# Patient Record
Sex: MALE | Race: BLACK OR AFRICAN AMERICAN | Employment: PART TIME | URBAN - METROPOLITAN AREA
[De-identification: names, ages, dates, MRNs, and addresses within clinical notes are randomized per-mention and may not be internally consistent; named-entity substitution may affect disease eponyms.]

---

## 2020-03-22 ENCOUNTER — HOSPITAL ENCOUNTER (EMERGENCY)
Facility: CLINIC | Age: 35
Discharge: HOME | End: 2020-03-22
Attending: EMERGENCY MEDICINE | Admitting: EMERGENCY MEDICINE
Payer: COMMERCIAL

## 2020-03-22 ENCOUNTER — APPOINTMENT (OUTPATIENT)
Dept: GENERAL RADIOLOGY | Facility: CLINIC | Age: 35
End: 2020-03-22
Attending: EMERGENCY MEDICINE
Payer: COMMERCIAL

## 2020-03-22 VITALS
WEIGHT: 160 LBS | RESPIRATION RATE: 18 BRPM | OXYGEN SATURATION: 99 % | BODY MASS INDEX: 24.25 KG/M2 | DIASTOLIC BLOOD PRESSURE: 81 MMHG | HEIGHT: 68 IN | TEMPERATURE: 98.7 F | SYSTOLIC BLOOD PRESSURE: 114 MMHG | HEART RATE: 82 BPM

## 2020-03-22 DIAGNOSIS — S63.501A WRIST SPRAIN, RIGHT, INITIAL ENCOUNTER: ICD-10-CM

## 2020-03-22 DIAGNOSIS — S69.91XA HAND INJURY, RIGHT, INITIAL ENCOUNTER: ICD-10-CM

## 2020-03-22 PROCEDURE — 99285 EMERGENCY DEPT VISIT HI MDM: CPT

## 2020-03-22 PROCEDURE — 29125 APPL SHORT ARM SPLINT STATIC: CPT | Mod: RT

## 2020-03-22 PROCEDURE — 25000132 ZZH RX MED GY IP 250 OP 250 PS 637: Performed by: EMERGENCY MEDICINE

## 2020-03-22 PROCEDURE — 73130 X-RAY EXAM OF HAND: CPT | Mod: RT

## 2020-03-22 RX ORDER — IBUPROFEN 600 MG/1
600 TABLET, FILM COATED ORAL ONCE
Status: COMPLETED | OUTPATIENT
Start: 2020-03-22 | End: 2020-03-22

## 2020-03-22 RX ADMIN — IBUPROFEN 600 MG: 600 TABLET, FILM COATED ORAL at 09:39

## 2020-03-22 ASSESSMENT — ENCOUNTER SYMPTOMS
ARTHRALGIAS: 1
NUMBNESS: 0

## 2020-03-22 ASSESSMENT — MIFFLIN-ST. JEOR: SCORE: 1650.26

## 2020-03-22 NOTE — ED AVS SNAPSHOT
[facility] Emergency Department  [address]  [facility] [address]  Phone:  [phone]  Fax:  [phone]                                    Naldo Cabral   MRN: [id number]    Department:  [facility] Emergency Department   Date of Visit:  3/22/2020           After Visit Summary Signature Page    I have received my discharge instructions, and my questions have been answered. I have discussed any challenges I see with this plan with the nurse or doctor.    ..........................................................................................................................................  Patient/Patient Representative Signature      ..........................................................................................................................................  Patient Representative Print Name and Relationship to Patient    ..................................................               ................................................  Date                                   Time    ..........................................................................................................................................  Reviewed by Signature/Title    ...................................................              ..............................................  Date                                               Time          22EPIC Rev 08/18

## 2020-03-22 NOTE — ED TRIAGE NOTES
pt dropped a tire on his right forearm yesterday mariam around 1900. C/o pain with touch and swelling. Took tylenol without relief.

## 2020-03-22 NOTE — ED PROVIDER NOTES
"  History     Chief Complaint:  Arm Injury    The history is provided by the patient.      Naldo Cabral is a right-hand dominant 34 year old male who presents with right wrist and hand pain after dropping a tire on his wrist yesterday. He reports taking Tylenol for his pain but has not found any relief with that. No numbness or weakness but pain increases with movement.    Patient denies any elbow pain or history of fracture or surgery to his right hand/wrist.    Allergies:  No Known Drug Allergies     Medications:    The patient is not currently taking any prescribed medications.    Past Medical History:    The patient denies any significant past medical history.    Past Surgical History:    The patient does not have any pertinent past surgical history.    Family History:    No past pertinent family history.    Social History:  Negative for tobacco use.  Positive for alcohol use.  Negative for drug use.  Marital Status:  Single      Review of Systems   Musculoskeletal: Positive for arthralgias (right wrist and hand).   Neurological: Negative for numbness.     Physical Exam     Patient Vitals for the past 24 hrs:   BP Temp Temp src Pulse Resp SpO2 Height Weight   03/22/20 0907 114/81 98.7  F (37.1  C) Temporal 82 18 99 % 1.727 m (5' 8\") 72.6 kg (160 lb)       Physical Exam  Resp:               Non-labored  Neuro:             Alert and cooperative  MSkel:             Moving all extremities  RUE:                No tenderness at elbow or in forearm.  Dorsum of hand tender.  No tenderness at anatomic snuff box, MCPs or fingers.  Cap refill normal.  Sensation normal.  Strength normal though effort increases pain.  Skin:                No rash or wound    Emergency Department Course   Imaging:  Radiology findings were communicated with the patient who voiced understanding of the findings.    XR Hand, Right, G/E 3 views:   Normal joint spacing and alignment. No fracture. As per radiology. "     Procedures  None.    Interventions:  0939 Ibuprofen 600 mg PO    Emergency Department Course:  Past medical records, nursing notes, and vitals reviewed.    0915 I performed an exam of the patient as documented above.     The patient was sent for a right hand x-ray while in the emergency department, results above.     1008 I rechecked the patient and discussed the results of his workup thus far.     Findings and plan explained to the Patient. Patient discharged home with instructions regarding supportive care, medications, and reasons to return. The importance of close follow-up was reviewed.     I personally reviewed the imaging results with the Patient and answered all related questions prior to discharge.     Impression & Plan   Medical Decision Making:  Naldo Cabral is a 34 year old male who presents with persistent right hand and wrist pain after an injury. X-rays were obtained with no evidence of fracture or dislocation. There is no tenderness over the anatomic snuff box and occult fracture of the scaphoid is not suspected. He will be treated with over the counter analgesics and a Velcro wrist splint letter was provided for work. Follow up in 1-2 weeks if symptoms persist.    Diagnosis:    ICD-10-CM    1. Wrist sprain, right, initial encounter  S63.501A    2. Hand injury, right, initial encounter  S69.91XA        Disposition:  Discharged to home.    Discharge Medications:  There are no discharge medications for this patient.    Scribe Disclosure:  I, Selena Burden, am serving as a scribe at 9:18 AM on 3/22/2020 to document services personally performed by Loan Carreno MD,* based on my observations and the provider's statements to me.      Loan Carreno MD  03/22/20 4553

## 2020-03-22 NOTE — LETTER
[facility] EMERGENCY DEPARTMENT  [address]  [facility] [address]  [phone]    Naldo Cabral  [address]  [facility] [address]  [phone] (home)     : 1985      To Whom it may concern:    Naldo Cabral was seen in our Emergency Department today, 2020 for an injury hand and wrist injury.  He can return to work with use of a wrist brace as needed.  Lifting restrictions may be required based on symptom control.  Follow-up in 1-2 weeks if symptoms persist.    Sincerely,      Loan Carreno MD

## 2020-04-21 ENCOUNTER — HOSPITAL ENCOUNTER (EMERGENCY)
Facility: CLINIC | Age: 35
Discharge: HOME | End: 2020-04-21
Attending: EMERGENCY MEDICINE | Admitting: EMERGENCY MEDICINE
Payer: COMMERCIAL

## 2020-04-21 VITALS
RESPIRATION RATE: 18 BRPM | SYSTOLIC BLOOD PRESSURE: 110 MMHG | OXYGEN SATURATION: 98 % | DIASTOLIC BLOOD PRESSURE: 68 MMHG | TEMPERATURE: 97.4 F

## 2020-04-21 DIAGNOSIS — L50.9 URTICARIAL RASH: ICD-10-CM

## 2020-04-21 PROCEDURE — 99282 EMERGENCY DEPT VISIT SF MDM: CPT

## 2020-04-21 RX ORDER — DIPHENHYDRAMINE HCL 25 MG
25 TABLET ORAL EVERY 6 HOURS PRN
Qty: 30 TABLET | Refills: 0 | Status: SHIPPED | OUTPATIENT
Start: 2020-04-21

## 2020-04-21 RX ORDER — DIAPER,BRIEF,INFANT-TODD,DISP
EACH MISCELLANEOUS
Qty: 1 TUBE | Refills: 0 | Status: SHIPPED | OUTPATIENT
Start: 2020-04-21

## 2020-04-21 ASSESSMENT — ENCOUNTER SYMPTOMS
NAUSEA: 0
TROUBLE SWALLOWING: 0
ABDOMINAL PAIN: 0
DIARRHEA: 0
COUGH: 0
VOMITING: 0
FEVER: 0
RESPIRATORY NEGATIVE: 1

## 2020-04-21 NOTE — ED AVS SNAPSHOT
[facility] Emergency Department  [address]  [facility] [address]  Phone:  [phone]  Fax:  [phone]                                    Naldo Cabral   MRN: [id number]    Department:  [facility] Emergency Department   Date of Visit:  4/21/2020           After Visit Summary Signature Page    I have received my discharge instructions, and my questions have been answered. I have discussed any challenges I see with this plan with the nurse or doctor.    ..........................................................................................................................................  Patient/Patient Representative Signature      ..........................................................................................................................................  Patient Representative Print Name and Relationship to Patient    ..................................................               ................................................  Date                                   Time    ..........................................................................................................................................  Reviewed by Signature/Title    ...................................................              ..............................................  Date                                               Time          22EPIC Rev 08/18

## 2020-04-21 NOTE — ED TRIAGE NOTES
Pt arrives with rash on back of neck, chest, and bilateral upper arms starting 2 days ago. Pt states it is itchy and mildly painful. ABCs intact.

## 2020-04-21 NOTE — ED PROVIDER NOTES
History     Chief Complaint:  Rash      HPI   Naldo Cabral is a 34 year old male who presents with rash. The patient says that 2 days ago he started using a new detergent as well as new energy pill. He says that since then he developed a rash on his du, chest, and bilateral upper arms. He endorses the use of an over the counter steroid cream to no relief. He denies any fever, cough, nausea, vomiting, diarrhea, abdominal pain, breathing issues, or swallowing issues.     Allergies:  No Known Drug Allergies    Medications:    Medications reviewed. No current medications.     Past Medical History:    Chlamydia  Trichomonas contact     Past Surgical History:    Surgical history reviewed. No pertinent surgical history.    Family History:    Lung cancer     Social History:  Smoking Status: Former Smoker  Smokeless Tobacco: Never Used  Alcohol Use: Negative   [facility]P: [facility]   Marital Status:  Single      Review of Systems   Constitutional: Negative for fever.   HENT: Negative for trouble swallowing.    Respiratory: Negative.  Negative for cough.    Gastrointestinal: Negative for abdominal pain, diarrhea, nausea and vomiting.   Skin: Positive for rash.   All other systems reviewed and are negative.      Physical Exam     Patient Vitals for the past 24 hrs:   BP Temp Temp src Heart Rate Resp SpO2   04/21/20 1536 110/68 97.4  F (36.3  C) Oral 81 18 98 %        Physical Exam  General: Alert, no acute distress; nontoxic appearing  HEENT:  Moist mucous membranes.  Posterior oropharynx clear, no exudates.  Conjunctiva normal.   CV:  RRR, no m/r/g, skin warm and well perfused  Pulm:  CTAB, no wheezes/ronchi/rales.  No acute distress, breathing comfortably  GI:  Soft, nontender, nondistended.  No rebound or guarding.  Normal bowel sounds  MSK:  Moving all extremities.  No focal areas of edema, erythema, or tenderness  Skin:  WWP, no lower extremity edema, skin color normal, no diaphoresis; sporadic  blanching urticarial rash to upper arms, abdomen, chest, posterior neck; no palmar rash      Emergency Department Course     Emergency Department Course:    1549 Nursing notes and vitals reviewed.    1600 I performed an exam of the patient as documented above.      The patient is discharged to home.     Impression & Plan      Medical Decision Making:  Naldo Cabral is a 34 year old male who presented to the ED here today for evaluation of rash. Differentials considered included allergic phenomena, angioedema, cellulitis, vasculitis, drug reaction, viral exanthem, SJS, TEN, atopic dermatitis, psoriasis, tinea infections, amongst others. Symptoms here today most consistent with allergic phenomena.  There are no signs of systemic reaction such as angioedema, respiratory compromise, shock, oropharynx edema, etc.  The patient overall looks well here today without signs of serious allergic reaction/anaphylaxis. They were discharged with the blow medications.  They will follow-up with her primary care provider within the next 2-3 days for recheck.  They will return to the ED for any changing worsening symptoms, wheezing, progressive shortness of breath, facial or throat/tongue swelling, new concerns.  All questions were answered prior to patient's discharge.  They were in agreement with the treatment plan as stated above.    Diagnosis:    ICD-10-CM    1. Urticarial rash  L50.9      Disposition:   Findings and plan explained to the Patient. Patient discharged home with instructions regarding supportive care, medications, and reasons to return. The importance of close follow-up was reviewed.      Discharge Medications:  Discharge Medication List as of 4/21/2020  4:11 PM      START taking these medications    Details   diphenhydrAMINE (BENADRYL) 25 MG tablet Take 1 tablet (25 mg) by mouth every 6 hours as needed for itching or allergies, Disp-30 tablet,R-0, Local Print      hydrocortisone (CORTAID) 1 % external ointment Apply  to affected areas of skin 4 times a day for 1 weekDisp-1 Tube,R-0Local Print             Scribe Disclosure:  I, Tal Gonzalez, am serving as a scribe at 3:59 PM on 4/21/2020 to document services personally performed by Harlan Marquez MD based on my observations and the provider's statements to me.      [facility] EMERGENCY DEPARTMENT       Harlan Marquez MD  04/21/20 7554

## 2020-08-17 ENCOUNTER — HOSPITAL ENCOUNTER (EMERGENCY)
Facility: CLINIC | Age: 35
Discharge: HOME | End: 2020-08-17
Attending: EMERGENCY MEDICINE | Admitting: EMERGENCY MEDICINE
Payer: COMMERCIAL

## 2020-08-17 VITALS
RESPIRATION RATE: 20 BRPM | SYSTOLIC BLOOD PRESSURE: 127 MMHG | TEMPERATURE: 99.2 F | BODY MASS INDEX: 23.39 KG/M2 | WEIGHT: 149.03 LBS | HEIGHT: 67 IN | OXYGEN SATURATION: 97 % | DIASTOLIC BLOOD PRESSURE: 76 MMHG | HEART RATE: 65 BPM

## 2020-08-17 DIAGNOSIS — J02.0 ACUTE STREPTOCOCCAL PHARYNGITIS: ICD-10-CM

## 2020-08-17 LAB
DEPRECATED S PYO AG THROAT QL EIA: POSITIVE
SPECIMEN SOURCE: ABNORMAL

## 2020-08-17 PROCEDURE — 87880 STREP A ASSAY W/OPTIC: CPT | Performed by: EMERGENCY MEDICINE

## 2020-08-17 PROCEDURE — 99283 EMERGENCY DEPT VISIT LOW MDM: CPT

## 2020-08-17 PROCEDURE — U0003 INFECTIOUS AGENT DETECTION BY NUCLEIC ACID (DNA OR RNA); SEVERE ACUTE RESPIRATORY SYNDROME CORONAVIRUS 2 (SARS-COV-2) (CORONAVIRUS DISEASE [COVID-19]), AMPLIFIED PROBE TECHNIQUE, MAKING USE OF HIGH THROUGHPUT TECHNOLOGIES AS DESCRIBED BY CMS-2020-01-R: HCPCS | Performed by: EMERGENCY MEDICINE

## 2020-08-17 RX ORDER — AMOXICILLIN 500 MG/1
500 CAPSULE ORAL 2 TIMES DAILY
Qty: 20 CAPSULE | Refills: 0 | Status: SHIPPED | OUTPATIENT
Start: 2020-08-17 | End: 2020-08-27

## 2020-08-17 ASSESSMENT — ENCOUNTER SYMPTOMS
VOMITING: 0
COUGH: 0
FACIAL SWELLING: 0
ACTIVITY CHANGE: 0
FEVER: 1
NECK PAIN: 0
SEIZURES: 0
FATIGUE: 0
ABDOMINAL PAIN: 0
MYALGIAS: 0
CONFUSION: 0
SORE THROAT: 1
SHORTNESS OF BREATH: 0
TROUBLE SWALLOWING: 0
NUMBNESS: 0
NECK STIFFNESS: 0
WOUND: 0
WEAKNESS: 0
DIARRHEA: 0
NAUSEA: 0

## 2020-08-17 ASSESSMENT — MIFFLIN-ST. JEOR: SCORE: 1579.63

## 2020-08-17 NOTE — ED TRIAGE NOTES
C/o sore throat and headache for 3 days. Also c/o subjective fevers at home. Denies exposure to anyone who was Covid positive.

## 2020-08-17 NOTE — ED PROVIDER NOTES
History     Chief Complaint:  Sore throat       HPI  Naldo Cabral is a 35 year old year old male who presents for evaluation of 3 days of sore throat and subjective fevers.  Patient notes pain with swallowing and mild hoarseness of his voice.  He denies any cough, shortness of breath, chest pain.  No nausea or vomiting.  No diarrhea.  He denies any abdominal pain.  No rash.  No loss of sense of taste or smell.  He denies any known ill contacts.  He notes mild ear fullness but no significant pain.  He reports he has had strep throat in the past and this feels similar.  He denies any other symptoms at this time.        Allergies:  No Known Drug Allergies      Medications:   Past medical history reviewed. No pertinent medical history.      Medical History:   Chlamydia      Surgical History   Surgical history reviewed. No pertinent surgical history.      Family History:   Lung cancer      Social History:  Smoking Status: Former Smoker    Type: Cigarettes    Quit 2012  Smokeless Tobacco: Never Used  Alcohol Use: Negative  Drug Use: Negative  Primary Care: [facility]       Review of Systems   Constitutional: Positive for fever. Negative for activity change and fatigue.   HENT: Positive for ear pain and sore throat. Negative for congestion, facial swelling, hearing loss, mouth sores and trouble swallowing.    Respiratory: Negative for cough and shortness of breath.    Cardiovascular: Negative for chest pain.   Gastrointestinal: Negative for abdominal pain, diarrhea, nausea and vomiting.   Musculoskeletal: Negative for myalgias, neck pain and neck stiffness.   Skin: Negative for rash and wound.   Neurological: Negative for seizures, syncope, weakness and numbness.   Psychiatric/Behavioral: Negative for confusion.   All other systems reviewed and are negative.        Physical Exam     Patient Vitals for the past 24 hrs:   BP Temp Temp src Pulse Resp SpO2 Height Weight   08/17/20 0808 127/76 99.2  F  "(37.3  C) Temporal 65 20 97 % 1.702 m (5' 7\") 67.6 kg (149 lb 0.5 oz)          Physical Exam  General: Well appearing, nontoxic. Resting comfortably  Head:  Scalp, face, and head appear normal  Eyes:  Pupils are equal, round    Conjunctivae non-injected and sclerae white  ENT:    The external nose is normal    Pinnae are normal.  Bilateral auditory canals are normal and clear.  Bilateral tympanic membranes are mildly erythematous without any significant middle ear effusions, bulging, bleeding or perforation.    The oropharynx is normal, mucous membranes moist    Posterior pharynx is erythematous with +1 tonsillar swelling and left tonsillar exudates.  There is no peritonsillar swelling or fullness.  No asymmetrical swelling.  Uvula is normal. Uvula is in the midline.  No trismus.  Patient is tolerating secretions normally.  Tongue is normal.  Neck:  Normal range of motion.  No significant cervical lymphadenopathy.  The soft tissues of the anterior neck and sublingual region are normal.    There is no rigidity noted    Trachea is in the midline  CV:  Regular rate and rhythm     Normal S1/S2, no S3/S4    No murmur or rub  Resp:  Lungs are clear and equal bilaterally    There is no tachypnea    No increased work of breathing    No rales, wheezing, or rhonchi  GI:  Abdomen is soft, no rigidity or guarding    No distension, or mass    No tenderness or rebound tenderness   MS:  Normal muscular tone  Skin:  No rash or acute skin lesions noted  Neuro: Awake and alert    Speech is normal and fluent    Moves all extremities spontaneously  Psych:  Normal affect.  Appropriate interactions.      Emergency Department Course     Laboratory:  Laboratory findings were communicated with the patient who voiced understanding of the findings.    COVID-19 Virus (Coronavirus), PCR NP Swab: pending    Rapid Strep Test: Positive      Emergency Department Course:    0810 Nursing notes and vitals reviewed.    0820 I performed an exam of the " patient as documented above.     0832 A nares swab sample was obtained for laboratory testing as documented above.    0905 Findings and plan explained to the Patient. Patient discharged home with instructions regarding supportive care, medications, and reasons to return. The importance of close follow-up was reviewed. The patient was prescribed as below.    Impression & Plan     Medical Decision Making:  Naldo Cabral is a 35 year old male who presents with sore throat and clinical evidence of pharyngitis.  The rapid strep test is positive. I see no clinical evidence of  peritonsillar abscess, retropharyngeal abscess, Lemierre's Syndrome, epiglottis, or Jones's angina. The patient's symptoms are consistent with streptococcal pharyngitis.  I have recommended treatment with antibiotics and analgesics.  Also considered is COVID-19. The patient was tested and this is pending. Given the positive rapid strep test this is less likely. Return if increasing pain, change in voice, neck pain, vomiting, fever, or shortness of breath. Follow-up with primary physician if not improving in 3-5 days. Return precautions were discussed with patient. The patient's questions were answered and the patient was agreeable with discharge.     Covid-19  Naldo Cabral was evaluated during a global COVID-19 pandemic, which necessitated consideration that the patient might be at risk for infection with the SARS-CoV-2 virus that causes COVID-19.   Applicable protocols for evaluation were followed during the patient's care.   COVID-19 was considered as part of the patient's evaluation. The plan for testing is:  a test was obtained during this visit.      Diagnosis:     ICD-10-CM    1. Acute streptococcal pharyngitis  J02.0         Disposition:  Discharged to home.    Discharge Medications:  New Prescriptions    ACETAMINOPHEN 500 MG CAPS    Take 2 capsules by mouth every 8 hours as needed For aches, pain, fever    AMOXICILLIN (AMOXIL) 500 MG CAPSULE     Take 1 capsule (500 mg) by mouth 2 times daily for 10 days       Scribe Disclosure:  I, Farhana Ardon, am serving as a scribe at 8:11 AM on 8/17/2020 to document services personally performed by London Shepard MD based on my observations and the provider's statements to me.      London Shepard MD  08/17/20 0910

## 2020-08-17 NOTE — ED AVS SNAPSHOT
[facility] Emergency Department  [address]  [facility] [address]  Phone:  [phone]  Fax:  [phone]                                    Naldo Cabral   MRN: [id number]    Department:  [facility] Emergency Department   Date of Visit:  8/17/2020           After Visit Summary Signature Page    I have received my discharge instructions, and my questions have been answered. I have discussed any challenges I see with this plan with the nurse or doctor.    ..........................................................................................................................................  Patient/Patient Representative Signature      ..........................................................................................................................................  Patient Representative Print Name and Relationship to Patient    ..................................................               ................................................  Date                                   Time    ..........................................................................................................................................  Reviewed by Signature/Title    ...................................................              ..............................................  Date                                               Time          22EPIC Rev 08/18

## 2020-08-17 NOTE — LETTER
August 17, 2020      To Whom It May Concern:      Naldo Cabral was seen in our Emergency Department today, 08/17/20.  I expect his condition to improve over the next 2-3 days.  He may return to work when improved.    Sincerely,        London Shepard MD

## 2020-08-18 LAB
SARS-COV-2 RNA SPEC QL NAA+PROBE: NOT DETECTED
SPECIMEN SOURCE: NORMAL

## 2020-08-19 ENCOUNTER — NURSE TRIAGE (OUTPATIENT)
Dept: NURSING | Facility: CLINIC | Age: 35
End: 2020-08-19

## 2020-08-19 NOTE — TELEPHONE ENCOUNTER
Coronavirus (COVID-19) Notification    Lab Result   Lab test 2019-nCoV rRt-PCR OR SARS-COV-2 PCR    Nasopharyngeal AND/OR Oropharyngeal swab is NEGATIVE for 2019-nCoV RNA [OR] SARS-COV-2 RNA (COVID-19) RNA    Your result was negative. This means that we didn't find the virus that causes COVID-19 in your sample. A test may show negative when you do actually have the virus. This can happen when the virus is in the early stages of infection, before you feel illness symptoms.    If you have symptoms   Stay home and away from others (self-isolate) until you meet ALL of the guidelines below:    You've had no fever--and no medicine that reduces fever--for 3 full days (72 hours). And      Your other symptoms have gotten better. For example, your cough or breathing has improved. And     At least 10 days have passed since your symptoms started.    During this time:    Stay home. Don't go to work, school or anywhere else.     Stay in your own room, including for meals. Use your own bathroom if you can.    Stay away from others in your home. No hugging, kissing or shaking hands. No visitors.    Clean  high touch  surfaces often (doorknobs, counters, handles, etc.). Use a household cleaning spray or wipes. You can find a full list on the EPA website at www.epa.gov/pesticide-registration/list-n-disinfectants-use-against-sars-cov-2.    Cover your mouth and nose with a mask, tissue or washcloth to avoid spreading germs.    Wash your hands and face often with soap and water.    Going back to work  Check with your employer for any guidelines to follow for going back to work.  You are sent a letter for your Employer which will serve as formal document notice that you, the employee, tested negative for COVID-19, as of the testing date shown above.    If your symptoms worsen or other concerning symptoms, contact PCP, oncare or consider returning to Emergency Dept.    Where can I get more information?    [facility]:  [email or web address]    Coronavirus Basics: www.health.[facility]./diseases/coronavirus/basics.html    [facility] Hotline (331-288-0020)      Jasmin Mckeon RN  [facility]

## 2020-08-21 ENCOUNTER — HOSPITAL ENCOUNTER (EMERGENCY)
Facility: CLINIC | Age: 35
Discharge: HOME | End: 2020-08-21
Attending: PHYSICIAN ASSISTANT | Admitting: PHYSICIAN ASSISTANT
Payer: COMMERCIAL

## 2020-08-21 VITALS
HEART RATE: 87 BPM | WEIGHT: 143.3 LBS | BODY MASS INDEX: 22.49 KG/M2 | OXYGEN SATURATION: 98 % | DIASTOLIC BLOOD PRESSURE: 87 MMHG | TEMPERATURE: 98.6 F | SYSTOLIC BLOOD PRESSURE: 139 MMHG | RESPIRATION RATE: 18 BRPM | HEIGHT: 67 IN

## 2020-08-21 DIAGNOSIS — T50.905A MEDICATION REACTION, INITIAL ENCOUNTER: ICD-10-CM

## 2020-08-21 DIAGNOSIS — J02.0 STREPTOCOCCAL PHARYNGITIS: ICD-10-CM

## 2020-08-21 PROCEDURE — 25000132 ZZH RX MED GY IP 250 OP 250 PS 637: Performed by: PHYSICIAN ASSISTANT

## 2020-08-21 PROCEDURE — 99283 EMERGENCY DEPT VISIT LOW MDM: CPT

## 2020-08-21 RX ORDER — IBUPROFEN 600 MG/1
600 TABLET, FILM COATED ORAL ONCE
Status: COMPLETED | OUTPATIENT
Start: 2020-08-21 | End: 2020-08-21

## 2020-08-21 RX ORDER — AZITHROMYCIN 500 MG/1
500 TABLET, FILM COATED ORAL DAILY
Qty: 5 TABLET | Refills: 0 | Status: SHIPPED | OUTPATIENT
Start: 2020-08-21 | End: 2020-08-26

## 2020-08-21 RX ADMIN — IBUPROFEN 600 MG: 600 TABLET ORAL at 10:04

## 2020-08-21 ASSESSMENT — ENCOUNTER SYMPTOMS
COUGH: 0
CHILLS: 1
SORE THROAT: 1
TROUBLE SWALLOWING: 1
FEVER: 1

## 2020-08-21 ASSESSMENT — MIFFLIN-ST. JEOR: SCORE: 1553.63

## 2020-08-21 NOTE — LETTER
August 21, 2020      To Whom It May Concern:      Naldo Cabral was seen in our Emergency Department today, 08/21/20.  I expect his condition to improve over the next 2 days.  He may return to work/school when improved.    Sincerely,        Lady Eastman RN

## 2020-08-21 NOTE — DISCHARGE INSTRUCTIONS
*No school or work until you have been without a fever for 24 hours with tylenol or motrin.  *Take medications as prescribed.  Ibuprofen and/or tylenol for pain. Continue your current medications  *Follow-up with your doctor for a recheck in 2-3 days.    *Return if you develop difficulty breathing or swallowing, are unable to keep fluids down, faint or feel like you will faint or become worse in any way.       Discharge Instructions  Sore Throat  You were seen today for a sore throat.  Most (>80%) sore throats are caused by a virus. Antibiotics do not help with viral infections, but you can fight off the virus on your own.  In this case, your sore throat would be treated with medications for your pain and fever.    Strep throat is a kind of sore throat caused by Group A streptococcus bacteria.  This type of sore throat is treated with antibiotics.  If you had a rapid test done today for strep throat and it did not show infection, a culture is done in some cases. The culture can take several days to complete. If the culture shows you have strep throat, we will call you and get you a prescription for antibiotics. We will not contact you with a negative culture result.  Generally, every Emergency Department visit should have a follow-up clinic visit with either a primary or a specialty clinic/provider. Please follow-up as instructed by your emergency provider today.  Return to the Emergency Department if:  If you have difficulty breathing.  If you are drooling because you are unable to swallow.  You become dehydrated due to difficulty drinking. Signs of dehydration include weakness, dry mouth, and urinating less than 3 times per day.  If you develop swelling of the neck or tongue.  If you develop a high fever with either severe or unusual headache or stiff neck.    Treatment:    Pain relief -- Non-prescription pain medications, such as Tylenol  (acetaminophen) or Motrin , Advil  (ibuprofen) are usually recommended for  pain.  Do not use a medicine that you are allergic to, or if your provider has told you not to use it.  Soft or liquid diet. Concentrate on liquids to keep yourself hydrated. Cold liquids (popsicles, ice cream, etc.) may feel good on your throat.  If you were given a prescription for medicine here today, be sure to read all of the information (including the package insert) that comes with your prescription.  This will include important information about the medicine, its side effects, and any warnings that you need to know about.  The pharmacist who fills the prescription can provide more information and answer questions you may have about the medicine.  If you have questions or concerns that the pharmacist cannot address, please call or return to the Emergency Department.   Remember that you can always come back to the Emergency Department if you are not able to see your regular provider in the amount of time listed above, if you get any new symptoms, or if there is anything that worries you.

## 2020-08-21 NOTE — ED TRIAGE NOTES
Pt started on Amoxicillin on Monday for strep throat.  He reports burning feeling in throat along with chills and sweats.  He stopped taking the amoxicillin yesterday as he feels he may be allergic to the amoxicillin.  No rash.

## 2022-04-03 NOTE — ED PROVIDER NOTES
"History     Chief Complaint:  Medication Reaction       HPI   Naldo Cabral is a 35 year old male who presents with a medication reaction. The patient was recently tested positive for strep 4 days ago and started on amoxicillin. He states that he has been taking the amoxicillin 2x per day, however, he reports having a burning sensation in his throat, dizziness, and sweating after taking it. He reports having a more difficult time swallowing after taking the amoxicillin but is still able to swallow. The patient was concerned about possibly being allergic to amoxicillin and stopped taking it yesterday. He states that he is still having fevers, chills, and a sore throat. He has not tried taking ibuprofen or tylenol. He denies having a cough or rash.     Allergies:  Amoxicillin     Medications:    Amoxicillin - self discontinued    Past Medical History:     Medical history reviewed. No pertinent medical history.    Past Surgical History:    Surgical history reviewed. No pertinent surgical history.    Family History:    Lung cancer     Social History:  Smoking Status: Former Smoker               Type: Cigarettes               Quit 2012  Smokeless Tobacco: Never Used  Alcohol Use: Negative  Drug Use: Negative  Primary Care: [facility], [place] & [facility]        Review of Systems   Constitutional: Positive for chills and fever.   HENT: Positive for sore throat and trouble swallowing.    Respiratory: Negative for cough.    Skin: Negative for rash.   All other systems reviewed and are negative.      Physical Exam     Patient Vitals for the past 24 hrs:   BP Temp Temp src Pulse Resp SpO2 Height Weight   08/21/20 0951 -- 98.6  F (37  C) Oral -- -- -- -- --   08/21/20 0949 139/87 -- -- 87 18 98 % 1.702 m (5' 7\") 65 kg (143 lb 4.8 oz)        Physical Exam  General: Alert, interactive.   Head:  Scalp is atraumatic.  Eyes:  EOM intact. The pupils are equal, round, and reactive to light. No scleral icterus or conjunctivitis. "   ENT:                                      Ears:  The external ears are normal. TM's non-erythematous. External canals normal.    Nose:  The external nose is normal.  Throat:  The oropharynx is erythematous. Mildly hypertrophic tonsils with erythema, no exudate. Uvula midline. Handling secretions. Mucus membranes are moist.                 Neck:  Normal range of motion. There is no rigidity.   CV:  Regular rate and rhythm. No murmur. 2+ radial pulses  Resp:  Breath sounds are clear bilaterally. Non-labored, no retractions or accessory muscle use.  MS:  Normal range of motion.   Skin:  Warm and dry.   Neuro:  Strength and sensation grossly intact.   Psych:  Awake. Alert.  Appropriate interactions.     Emergency Department Course     Interventions:  1004 Ibuprofen 600 mg oral    Emergency Department Course:  Past medical records, nursing notes, and vitals reviewed.    0953 I performed an exam of the patient as documented above.      Findings and plan explained to the Patient. Patient discharged home with instructions regarding supportive care, medications, and reasons to return. The importance of close follow-up was reviewed. The patient was prescribed azithromycin.         Impression & Plan     Medical Decision Making:  Naldo Cabral is a 35 year old male who presents to the emergency department today with sore throat.  Patient was diagnosed with strep throat 4 days ago and started on amoxicillin.  He reports after taking amoxicillin his throat starts to burn and he has difficulty swallowing, he is concerned about an allergy to the amoxicillin.  Unclear if this is a true allergy, though will switch to azithromycin.  There is no evidence of more serious etiology including peritonsillar abscess, retropharyngeal abscess, epiglottitis, etc.  Patient is well-appearing and vitals normal.  Recommended Tylenol and ibuprofen as needed for pain.  Azithromycin as prescribed.  Follow-up close with primary care provider should  symptoms persist.  Patient agrees with this plan all questions and concerns addressed prior to discharge home.      Discharge Diagnosis:    ICD-10-CM    1. Streptococcal pharyngitis  J02.0    2. Medication reaction, initial encounter  T50.585K        Disposition:  The patient is discharged to home.    Discharge Medications:  New Prescriptions    AZITHROMYCIN (ZITHROMAX) 500 MG TABLET    Take 1 tablet (500 mg) by mouth daily for 5 days       Scribe Disclosure:  Magdy CASTILLO, am serving as a scribe at 9:53 AM on 8/21/2020 to document services personally performed by Yoly Felix PA-C based on my observations and the provider's statements to me.      8/21/2020   Yoly Felix PA-C Luell, Amy Jolene, PA-C  08/21/20 1010     No actionable labs/imaging. Spoke to nursing home nurse manager. States that patient called 911 on her own - vitals were stable, given nebs but still wanted ER eval. Pily Reardon, EM PGY3 Pt reassessed - does not feel safe going home. Cannot DC pt, will readmit. Pily Reardon, EM PGY3